# Patient Record
Sex: MALE | Race: WHITE | URBAN - METROPOLITAN AREA
[De-identification: names, ages, dates, MRNs, and addresses within clinical notes are randomized per-mention and may not be internally consistent; named-entity substitution may affect disease eponyms.]

---

## 2020-06-16 ENCOUNTER — OFFICE VISIT (OUTPATIENT)
Dept: URBAN - METROPOLITAN AREA CLINIC 113 | Facility: CLINIC | Age: 40
End: 2020-06-16

## 2020-06-25 ENCOUNTER — OFFICE VISIT (OUTPATIENT)
Dept: URBAN - METROPOLITAN AREA CLINIC 113 | Facility: CLINIC | Age: 40
End: 2020-06-25

## 2020-07-23 ENCOUNTER — OFFICE VISIT (OUTPATIENT)
Dept: URBAN - METROPOLITAN AREA CLINIC 113 | Facility: CLINIC | Age: 40
End: 2020-07-23

## 2020-07-25 ENCOUNTER — TELEPHONE ENCOUNTER (OUTPATIENT)
Dept: URBAN - METROPOLITAN AREA CLINIC 13 | Facility: CLINIC | Age: 40
End: 2020-07-25

## 2020-07-25 RX ORDER — LINACLOTIDE 290 UG/1
TAKE 1 CAPSULE DAILY ON EMPTY STOMACH CAPSULE, GELATIN COATED ORAL
Qty: 30 | Refills: 5 | OUTPATIENT
Start: 2017-01-24 | End: 2017-04-13

## 2020-07-25 RX ORDER — COLESEVELAM HYDROCHLORIDE 625 MG/1
TAKE 3 TABLET TWICE DAILY TABLET, FILM COATED ORAL
Refills: 0 | OUTPATIENT
End: 2020-06-16

## 2020-07-25 RX ORDER — ATORVASTATIN CALCIUM 20 MG/1
TAKE 1 TABLET DAILY TABLET, FILM COATED ORAL
Refills: 0 | OUTPATIENT
End: 2018-04-23

## 2020-07-25 RX ORDER — LISINOPRIL 10 MG/1
TAKE 1 TABLET DAILY TABLET ORAL
Refills: 0 | OUTPATIENT
End: 2020-06-16

## 2020-07-25 RX ORDER — OMEPRAZOLE 20 MG/1
TAKE 1 CAPSULE DAILY CAPSULE, DELAYED RELEASE ORAL
Qty: 90 | Refills: 3 | OUTPATIENT
End: 2018-04-23

## 2020-07-25 RX ORDER — LUBIPROSTONE 24 UG/1
TAKE ONE CAPSULE BY MOUTH TWICE A DAY CAPSULE, GELATIN COATED ORAL
Qty: 60 | Refills: 5 | OUTPATIENT
Start: 2017-04-20 | End: 2017-11-03

## 2020-07-26 ENCOUNTER — TELEPHONE ENCOUNTER (OUTPATIENT)
Dept: URBAN - METROPOLITAN AREA CLINIC 13 | Facility: CLINIC | Age: 40
End: 2020-07-26

## 2020-07-26 RX ORDER — ESOMEPRAZOLE MAGNESIUM 20 MG/1
TAKE ONE CAPSULE BY MOUTH EVERY DAY CAPSULE, DELAYED RELEASE ORAL
Qty: 90 | Refills: 0 | Status: ACTIVE | COMMUNITY
Start: 2018-03-29

## 2020-07-26 RX ORDER — FAMOTIDINE 20 MG/1
TABLET ORAL
Qty: 90 | Refills: 0 | Status: ACTIVE | COMMUNITY
Start: 2020-01-17

## 2020-07-26 RX ORDER — PROPAFENONE 225 MG/1
CAPSULE, EXTENDED RELEASE ORAL
Qty: 180 | Refills: 0 | Status: ACTIVE | COMMUNITY
Start: 2020-01-29

## 2020-07-26 RX ORDER — ALLOPURINOL 100 MG/1
TAKE 1 TABLET BY MOUTH ONCE DAILY TABLET ORAL
Qty: 60 | Refills: 0 | Status: ACTIVE | COMMUNITY
Start: 2018-02-23

## 2020-07-26 RX ORDER — PROPAFENONE HYDROCHLORIDE 225 MG/1
TAKE 1 TABLET EVERY TWELVE HOURS TABLET, FILM COATED ORAL
Refills: 0 | Status: ACTIVE | COMMUNITY

## 2020-07-26 RX ORDER — PANTOPRAZOLE 20 MG/1
TABLET, DELAYED RELEASE ORAL
Qty: 180 | Refills: 0 | Status: ACTIVE | COMMUNITY
Start: 2020-01-20

## 2020-07-26 RX ORDER — MIRTAZAPINE 45 MG/1
TABLET, FILM COATED ORAL
Qty: 90 | Refills: 0 | Status: ACTIVE | COMMUNITY
Start: 2020-06-30

## 2020-07-26 RX ORDER — PANTOPRAZOLE 20 MG/1
TABLET, DELAYED RELEASE ORAL
Qty: 180 | Refills: 0 | Status: ACTIVE | COMMUNITY
Start: 2020-04-21

## 2020-07-26 RX ORDER — DILTIAZEM HYDROCHLORIDE 120 MG/1
TAKE ONE CAPSULE BY MOUTH EVERY DAY CAPSULE, COATED, EXTENDED RELEASE ORAL
Qty: 90 | Refills: 0 | Status: ACTIVE | COMMUNITY
Start: 2018-01-15

## 2020-07-26 RX ORDER — MIRTAZAPINE 45 MG/1
TABLET, FILM COATED ORAL
Qty: 90 | Refills: 0 | Status: ACTIVE | COMMUNITY
Start: 2020-04-07

## 2020-07-26 RX ORDER — ESOMEPRAZOLE MAGNESIUM 20 MG/1
CAPSULE, DELAYED RELEASE ORAL
Qty: 90 | Refills: 0 | Status: ACTIVE | COMMUNITY
Start: 2018-11-28

## 2020-07-26 RX ORDER — POLYETHYLENE GLYCOL 3350 17 G/17G
MIX 17 GRAMS OF POWDER (1 CAPFUL) IN 8 OZ. OF LIQUID OF CHOICE TWICE D POWDER, FOR SOLUTION ORAL
Qty: 1054 | Refills: 0 | Status: ACTIVE | COMMUNITY
Start: 2019-01-05

## 2020-07-26 RX ORDER — FAMOTIDINE 40 MG/1
TAKE 1/2 TABLET BY MOUTH AT BEDTIME TABLET ORAL
Qty: 45 | Refills: 0 | Status: ACTIVE | COMMUNITY
Start: 2020-04-02

## 2020-07-26 RX ORDER — NAPROXEN 500 MG/1
TABLET ORAL
Qty: 60 | Refills: 0 | Status: ACTIVE | COMMUNITY
Start: 2020-07-22

## 2020-07-26 RX ORDER — DILTIAZEM HYDROCHLORIDE 120 MG/1
CAPSULE, COATED, EXTENDED RELEASE ORAL
Qty: 90 | Refills: 0 | Status: ACTIVE | COMMUNITY
Start: 2020-04-02

## 2020-07-26 RX ORDER — LURASIDONE HYDROCHLORIDE 40 MG/1
TAKE 1 TABLET BEDTIME TABLET, FILM COATED ORAL
Refills: 0 | Status: ACTIVE | COMMUNITY

## 2020-07-26 RX ORDER — PROPAFENONE 225 MG/1
TAKE ONE CAPSULE BY MOUTH EVERY 12 HOURS CAPSULE, EXTENDED RELEASE ORAL
Qty: 180 | Refills: 0 | Status: ACTIVE | COMMUNITY
Start: 2019-10-14

## 2020-07-26 RX ORDER — DILTIAZEM HYDROCHLORIDE 120 MG/1
TAKE ONE CAPSULE BY MOUTH EVERY DAY CAPSULE, COATED, EXTENDED RELEASE ORAL
Qty: 90 | Refills: 0 | Status: ACTIVE | COMMUNITY
Start: 2018-12-28

## 2020-07-26 RX ORDER — LITHIUM CARBONATE 300 MG/1
TAKE ONE CAPSULE BY MOUTH 3 TIMES DAILY CAPSULE, GELATIN COATED ORAL
Qty: 90 | Refills: 0 | Status: ACTIVE | COMMUNITY
Start: 2019-06-25

## 2020-07-26 RX ORDER — FAMOTIDINE 20 MG/1
TABLET ORAL
Qty: 90 | Refills: 0 | Status: ACTIVE | COMMUNITY
Start: 2020-06-29

## 2020-07-26 RX ORDER — LITHIUM CARBONATE 300 MG/1
CAPSULE, GELATIN COATED ORAL
Qty: 180 | Refills: 0 | Status: ACTIVE | COMMUNITY
Start: 2019-01-08

## 2020-07-26 RX ORDER — HYDROXYZINE HYDROCHLORIDE 25 MG/1
TAKE AS DIRECTED TABLET, FILM COATED ORAL
Qty: 60 | Refills: 0 | Status: ACTIVE | COMMUNITY
Start: 2017-11-29

## 2020-07-26 RX ORDER — LITHIUM CARBONATE 300 MG/1
TAKE 1 TABLET 3 TIMES DAILY TABLET, FILM COATED, EXTENDED RELEASE ORAL
Refills: 0 | Status: ACTIVE | COMMUNITY

## 2020-07-26 RX ORDER — PROPAFENONE 225 MG/1
TAKE ONE CAPSULE BY MOUTH EVERY 12 HOURS CAPSULE, EXTENDED RELEASE ORAL
Qty: 180 | Refills: 0 | Status: ACTIVE | COMMUNITY
Start: 2019-04-24

## 2020-07-26 RX ORDER — ONDANSETRON 8 MG/1
TAKE 1 TABLET BY MOUTH 3 TIMES DAILY AS NEEDED TABLET ORAL
Qty: 30 | Refills: 0 | Status: ACTIVE | COMMUNITY
Start: 2018-10-18

## 2020-07-26 RX ORDER — PROPAFENONE 225 MG/1
CAPSULE, EXTENDED RELEASE ORAL
Qty: 180 | Refills: 0 | Status: ACTIVE | COMMUNITY
Start: 2020-04-21

## 2020-07-26 RX ORDER — PANTOPRAZOLE 20 MG/1
TABLET, DELAYED RELEASE ORAL
Qty: 180 | Refills: 0 | Status: ACTIVE | COMMUNITY
Start: 2018-11-16

## 2020-07-26 RX ORDER — FAMOTIDINE 20 MG/1
TAKE 1 TABLET BY MOUTH AT BEDTIME TABLET ORAL
Qty: 90 | Refills: 0 | Status: ACTIVE | COMMUNITY
Start: 2019-12-18

## 2020-07-26 RX ORDER — LITHIUM CARBONATE 300 MG/1
CAPSULE, GELATIN COATED ORAL
Qty: 270 | Refills: 0 | Status: ACTIVE | COMMUNITY
Start: 2020-04-07

## 2020-07-26 RX ORDER — PANTOPRAZOLE SODIUM 40 MG/1
TAKE 1 TABLET DAILY TABLET, DELAYED RELEASE ORAL
Refills: 6 | Status: ACTIVE | COMMUNITY

## 2020-07-26 RX ORDER — PROPAFENONE 225 MG/1
TAKE ONE CAPSULE BY MOUTH EVERY TWELVE HOURS CAPSULE, EXTENDED RELEASE ORAL
Qty: 60 | Refills: 0 | Status: ACTIVE | COMMUNITY
Start: 2018-03-17

## 2020-07-26 RX ORDER — POLYETHYLENE GLYCOL 3350 17 G/17G
POUR 1 PACKET (17 GRAMS) OF POWDER INTO A CUP (4-8 OUNCE.) OF WATER, J POWDER, FOR SOLUTION ORAL
Qty: 56 | Refills: 0 | Status: ACTIVE | COMMUNITY
Start: 2019-08-14

## 2020-07-26 RX ORDER — LOSARTAN POTASSIUM 25 MG/1
TAKE 1 TABLET BY MOUTH EVERY DAY TABLET, FILM COATED ORAL
Qty: 90 | Refills: 0 | Status: ACTIVE | COMMUNITY
Start: 2020-04-02

## 2020-07-26 RX ORDER — ALLOPURINOL 100 MG/1
TAKE 1 TABLET BY MOUTH EVERY DAY TABLET ORAL
Qty: 30 | Refills: 0 | Status: ACTIVE | COMMUNITY
Start: 2020-02-06

## 2020-07-26 RX ORDER — LITHIUM CARBONATE 300 MG/1
CAPSULE, GELATIN COATED ORAL
Qty: 180 | Refills: 0 | Status: ACTIVE | COMMUNITY
Start: 2019-03-05

## 2020-07-26 RX ORDER — POLYETHYLENE GLYCOL 3350 17 G/17G
MIX 17 GRAMS (1 CAPFUL) IN 8 OZ. OF LIQUID OF CHOICE TWICE DAILY POWDER, FOR SOLUTION ORAL
Qty: 1020 | Refills: 0 | Status: ACTIVE | COMMUNITY
Start: 2019-09-25

## 2020-07-26 RX ORDER — MIRTAZAPINE 45 MG/1
TABLET, FILM COATED ORAL
Qty: 90 | Refills: 0 | Status: ACTIVE | COMMUNITY
Start: 2020-01-07

## 2020-07-26 RX ORDER — LITHIUM CARBONATE 300 MG/1
CAPSULE, GELATIN COATED ORAL
Qty: 90 | Refills: 0 | Status: ACTIVE | COMMUNITY
Start: 2019-09-17

## 2020-07-26 RX ORDER — DILTIAZEM HYDROCHLORIDE 120 MG/1
CAPSULE, COATED, EXTENDED RELEASE ORAL
Qty: 90 | Refills: 0 | Status: ACTIVE | COMMUNITY
Start: 2020-06-29

## 2020-07-26 RX ORDER — LITHIUM CARBONATE 300 MG/1
TAKE ONE CAPSULE BY MOUTH 3 TIMES DAILY CAPSULE, GELATIN COATED ORAL
Qty: 90 | Refills: 0 | Status: ACTIVE | COMMUNITY
Start: 2018-03-20

## 2020-07-26 RX ORDER — LITHIUM CARBONATE 300 MG/1
CAPSULE, GELATIN COATED ORAL
Qty: 270 | Refills: 0 | Status: ACTIVE | COMMUNITY
Start: 2020-01-07

## 2020-07-26 RX ORDER — MIRTAZAPINE 30 MG/1
TAKE 1 TABLET DAILY IN THE EVENING TABLET, FILM COATED ORAL
Qty: 30 | Refills: 0 | Status: ACTIVE | COMMUNITY

## 2020-07-26 RX ORDER — PANTOPRAZOLE 20 MG/1
TAKE 1 TABLET BY MOUTH TWICE DAILY TABLET, DELAYED RELEASE ORAL
Qty: 60 | Refills: 0 | Status: ACTIVE | COMMUNITY
Start: 2018-05-14

## 2020-09-16 ENCOUNTER — OFFICE VISIT (OUTPATIENT)
Dept: URBAN - METROPOLITAN AREA CLINIC 113 | Facility: CLINIC | Age: 40
End: 2020-09-16
Payer: COMMERCIAL

## 2020-09-16 VITALS
SYSTOLIC BLOOD PRESSURE: 133 MMHG | HEART RATE: 69 BPM | BODY MASS INDEX: 34.86 KG/M2 | DIASTOLIC BLOOD PRESSURE: 79 MMHG | WEIGHT: 249 LBS | TEMPERATURE: 97.8 F | HEIGHT: 71 IN

## 2020-09-16 DIAGNOSIS — K76.89 LIVER LESION: ICD-10-CM

## 2020-09-16 DIAGNOSIS — R74.8 ELEVATED LIVER ENZYMES: ICD-10-CM

## 2020-09-16 DIAGNOSIS — K76.0 HEPATIC STEATOSIS: ICD-10-CM

## 2020-09-16 PROCEDURE — 99213 OFFICE O/P EST LOW 20 MIN: CPT | Performed by: NURSE PRACTITIONER

## 2020-09-16 RX ORDER — LITHIUM CARBONATE 300 MG/1
TAKE ONE CAPSULE BY MOUTH 3 TIMES DAILY CAPSULE, GELATIN COATED ORAL
Refills: 0 | Status: ACTIVE | COMMUNITY
Start: 2018-03-20

## 2020-09-16 RX ORDER — FAMOTIDINE 40 MG/1
TAKE 1/2 TABLET BY MOUTH AT BEDTIME TABLET ORAL
Qty: 45 | Refills: 0 | Status: ACTIVE | COMMUNITY
Start: 2020-04-02

## 2020-09-16 RX ORDER — PROPAFENONE HYDROCHLORIDE 225 MG/1
TAKE 1 TABLET EVERY TWELVE HOURS TABLET, FILM COATED ORAL
Refills: 0 | Status: ACTIVE | COMMUNITY

## 2020-09-16 RX ORDER — PANTOPRAZOLE 20 MG/1
TAKE 1 TABLET BY MOUTH TWICE DAILY TABLET, DELAYED RELEASE ORAL
Qty: 60 | Refills: 0 | Status: ACTIVE | COMMUNITY
Start: 2018-05-14

## 2020-09-16 RX ORDER — NAPROXEN 500 MG/1
TABLET ORAL
Qty: 60 | Refills: 0 | Status: ON HOLD | COMMUNITY
Start: 2020-07-22

## 2020-09-16 RX ORDER — DILTIAZEM HYDROCHLORIDE 120 MG/1
TAKE ONE CAPSULE BY MOUTH EVERY DAY CAPSULE, COATED, EXTENDED RELEASE ORAL
Qty: 90 | Refills: 0 | Status: ACTIVE | COMMUNITY
Start: 2018-01-15

## 2020-09-16 RX ORDER — HYDROXYZINE HYDROCHLORIDE 25 MG/1
TAKE AS DIRECTED TABLET, FILM COATED ORAL
Qty: 60 | Refills: 0 | Status: ON HOLD | COMMUNITY
Start: 2017-11-29

## 2020-09-16 RX ORDER — POLYETHYLENE GLYCOL 3350 17 G/17G
MIX 17 GRAMS OF POWDER (1 CAPFUL) IN 8 OZ. OF LIQUID OF CHOICE TWICE D POWDER, FOR SOLUTION ORAL
Qty: 1054 | Refills: 0 | Status: ACTIVE | COMMUNITY
Start: 2019-01-05

## 2020-09-16 RX ORDER — ATORVASTATIN CALCIUM 20 MG/1
1 TABLET TABLET, FILM COATED ORAL ONCE A DAY
Status: ACTIVE | COMMUNITY

## 2020-09-16 RX ORDER — ONDANSETRON 8 MG/1
TAKE 1 TABLET BY MOUTH 3 TIMES DAILY AS NEEDED TABLET ORAL
Qty: 30 | Refills: 0 | Status: ON HOLD | COMMUNITY
Start: 2018-10-18

## 2020-09-16 RX ORDER — LOSARTAN POTASSIUM 25 MG/1
TAKE 1 TABLET BY MOUTH EVERY DAY TABLET, FILM COATED ORAL
Qty: 90 | Refills: 0 | Status: ACTIVE | COMMUNITY
Start: 2020-04-02

## 2020-09-16 RX ORDER — LITHIUM CARBONATE 300 MG/1
TAKE 1 TABLET 3 TIMES DAILY TABLET, FILM COATED, EXTENDED RELEASE ORAL
Refills: 0 | Status: ON HOLD | COMMUNITY

## 2020-09-16 RX ORDER — LURASIDONE HYDROCHLORIDE 40 MG/1
TAKE 3  TABLET BEDTIME TABLET, FILM COATED ORAL
Refills: 0 | Status: ACTIVE | COMMUNITY

## 2020-09-16 RX ORDER — ESOMEPRAZOLE MAGNESIUM 20 MG/1
TAKE ONE CAPSULE BY MOUTH EVERY DAY CAPSULE, DELAYED RELEASE ORAL
Qty: 90 | Refills: 0 | Status: ON HOLD | COMMUNITY
Start: 2018-03-29

## 2020-09-16 RX ORDER — PROPAFENONE 225 MG/1
TAKE ONE CAPSULE BY MOUTH EVERY TWELVE HOURS CAPSULE, EXTENDED RELEASE ORAL
Qty: 60 | Refills: 0 | Status: ON HOLD | COMMUNITY
Start: 2018-03-17

## 2020-09-16 RX ORDER — MIRTAZAPINE 45 MG/1
1 TABLET AT BEDTIME TABLET, FILM COATED ORAL
Refills: 0 | Status: ACTIVE | COMMUNITY
Start: 2020-01-07

## 2020-09-16 RX ORDER — FAMOTIDINE 20 MG/1
TAKE 1 TABLET BY MOUTH AT BEDTIME TABLET ORAL
Qty: 90 | Refills: 0 | Status: ON HOLD | COMMUNITY
Start: 2019-12-18

## 2020-09-16 RX ORDER — POLYETHYLENE GLYCOL 3350 17 G/17G
POUR 1 PACKET (17 GRAMS) OF POWDER INTO A CUP (4-8 OUNCE.) OF WATER, J POWDER, FOR SOLUTION ORAL
Qty: 56 | Refills: 0 | Status: ON HOLD | COMMUNITY
Start: 2019-08-14

## 2020-09-16 RX ORDER — MIRTAZAPINE 30 MG/1
TAKE 1 TABLET DAILY IN THE EVENING TABLET, FILM COATED ORAL
Qty: 30 | Refills: 0 | Status: ACTIVE | COMMUNITY

## 2020-09-16 RX ORDER — PANTOPRAZOLE SODIUM 40 MG/1
TAKE 1 TABLET DAILY TABLET, DELAYED RELEASE ORAL
Refills: 6 | Status: ACTIVE | COMMUNITY

## 2020-09-16 RX ORDER — ALLOPURINOL 100 MG/1
TAKE 2  TABLET BY MOUTH ONCE DAILY TABLET ORAL
Refills: 0 | Status: ACTIVE | COMMUNITY
Start: 2018-02-23

## 2020-09-16 NOTE — HPI-OTHER HISTORIES
He provides labs from 6/4/20 which show AST 39, ALT 84, , T bili 0.8. His CBC and CMP were otherwise unremarkable.  Labs on 4/8/19 show normal liver function tests, normal ceruloplasmin. Labs 10/31/17 : iron 130, TIBC 336, iron sat 39%, ferritin 225. Negative AMA, ROOSEVELT, ASMA. Reactive HBsAb. Negative HAV Ab total, HBsAg, HCV Ab. Normal A1AT

## 2020-09-16 NOTE — HPI-TODAY'S VISIT:
40-year old gentleman with a history of bipolar disorder presenting for follow-up regarding elevated liver function tests.  He was last seen 6/16/20 for follow-up regarding elevated LFTs secondary to NAFLD; he was recommended efforts at weight reduction with plan to trend his liver enzymes. His GERD was controlled with pantoprazole and famotidine, and constipation managed with MiraLAX. Following the last visit, his PCP had an abdominal ultrasound performed, which reportedly revealed hepatomegaly and a liver lesion. Subsequent CT of the abdomen with and without contrast on 9/10/20 demonstrated hepatic steatosis and a small lesion within the medical hepatic segment which is low in attenuation on all phases, most consistent with a cyst. Labs : 7/29/2020 show H/H 16.1/47, MCV 91.1, , WBC 7.3.  AST 43, ALT 92, ALP 99, T bili 0.7, CMP otherwise unremarkable.  TSH 1.28.  Total cholesterol 132, triglycerides 116. He is asymptomatic from a GI standpoint, denying abdominal pain, nausea, vomiting, change in bowel habits, or blood per rectum. He has lost 6lb since the time of his last visit with a reduction in carbohydrates and sweets.

## 2020-12-01 ENCOUNTER — LAB OUTSIDE AN ENCOUNTER (OUTPATIENT)
Dept: URBAN - METROPOLITAN AREA CLINIC 113 | Facility: CLINIC | Age: 40
End: 2020-12-01

## 2020-12-17 ENCOUNTER — OFFICE VISIT (OUTPATIENT)
Dept: URBAN - METROPOLITAN AREA CLINIC 113 | Facility: CLINIC | Age: 40
End: 2020-12-17

## 2021-01-05 ENCOUNTER — OFFICE VISIT (OUTPATIENT)
Dept: URBAN - METROPOLITAN AREA CLINIC 113 | Facility: CLINIC | Age: 41
End: 2021-01-05

## 2021-02-17 ENCOUNTER — OFFICE VISIT (OUTPATIENT)
Dept: URBAN - METROPOLITAN AREA CLINIC 113 | Facility: CLINIC | Age: 41
End: 2021-02-17

## 2021-02-26 ENCOUNTER — OFFICE VISIT (OUTPATIENT)
Dept: URBAN - METROPOLITAN AREA CLINIC 113 | Facility: CLINIC | Age: 41
End: 2021-02-26

## 2021-03-03 ENCOUNTER — OFFICE VISIT (OUTPATIENT)
Dept: URBAN - METROPOLITAN AREA CLINIC 107 | Facility: CLINIC | Age: 41
End: 2021-03-03

## 2021-03-03 RX ORDER — LITHIUM CARBONATE 300 MG/1
TAKE 1 TABLET 3 TIMES DAILY TABLET, FILM COATED, EXTENDED RELEASE ORAL
Refills: 0 | Status: ON HOLD | COMMUNITY

## 2021-03-03 RX ORDER — POLYETHYLENE GLYCOL 3350 17 G/17G
POUR 1 PACKET (17 GRAMS) OF POWDER INTO A CUP (4-8 OUNCE.) OF WATER, J POWDER, FOR SOLUTION ORAL
Qty: 56 | Refills: 0 | Status: ON HOLD | COMMUNITY
Start: 2019-08-14

## 2021-03-03 RX ORDER — ESOMEPRAZOLE MAGNESIUM 20 MG/1
TAKE ONE CAPSULE BY MOUTH EVERY DAY CAPSULE, DELAYED RELEASE ORAL
Qty: 90 | Refills: 0 | Status: ON HOLD | COMMUNITY
Start: 2018-03-29

## 2021-03-03 RX ORDER — DILTIAZEM HYDROCHLORIDE 120 MG/1
TAKE ONE CAPSULE BY MOUTH EVERY DAY CAPSULE, COATED, EXTENDED RELEASE ORAL
Qty: 90 | Refills: 0 | Status: ACTIVE | COMMUNITY
Start: 2018-01-15

## 2021-03-03 RX ORDER — ALLOPURINOL 100 MG/1
TAKE 2  TABLET BY MOUTH ONCE DAILY TABLET ORAL
Refills: 0 | Status: ACTIVE | COMMUNITY
Start: 2018-02-23

## 2021-03-03 RX ORDER — ATORVASTATIN CALCIUM 20 MG/1
1 TABLET TABLET, FILM COATED ORAL ONCE A DAY
Status: ACTIVE | COMMUNITY

## 2021-03-03 RX ORDER — FAMOTIDINE 40 MG/1
TAKE 1/2 TABLET BY MOUTH AT BEDTIME TABLET ORAL
Qty: 45 | Refills: 0 | Status: ACTIVE | COMMUNITY
Start: 2020-04-02

## 2021-03-03 RX ORDER — MIRTAZAPINE 45 MG/1
1 TABLET AT BEDTIME TABLET, FILM COATED ORAL
Refills: 0 | Status: ACTIVE | COMMUNITY
Start: 2020-01-07

## 2021-03-03 RX ORDER — HYDROXYZINE HYDROCHLORIDE 25 MG/1
TAKE AS DIRECTED TABLET, FILM COATED ORAL
Qty: 60 | Refills: 0 | Status: ON HOLD | COMMUNITY
Start: 2017-11-29

## 2021-03-03 RX ORDER — LITHIUM CARBONATE 300 MG/1
TAKE ONE CAPSULE BY MOUTH 3 TIMES DAILY CAPSULE, GELATIN COATED ORAL
Refills: 0 | Status: ACTIVE | COMMUNITY
Start: 2018-03-20

## 2021-03-03 RX ORDER — PANTOPRAZOLE 20 MG/1
TAKE 1 TABLET BY MOUTH TWICE DAILY TABLET, DELAYED RELEASE ORAL
Qty: 60 | Refills: 0 | Status: ACTIVE | COMMUNITY
Start: 2018-05-14

## 2021-03-03 RX ORDER — NAPROXEN 500 MG/1
TABLET ORAL
Qty: 60 | Refills: 0 | Status: ON HOLD | COMMUNITY
Start: 2020-07-22

## 2021-03-03 RX ORDER — PROPAFENONE 225 MG/1
TAKE ONE CAPSULE BY MOUTH EVERY TWELVE HOURS CAPSULE, EXTENDED RELEASE ORAL
Qty: 60 | Refills: 0 | Status: ON HOLD | COMMUNITY
Start: 2018-03-17

## 2021-03-03 RX ORDER — PROPAFENONE HYDROCHLORIDE 225 MG/1
TAKE 1 TABLET EVERY TWELVE HOURS TABLET, FILM COATED ORAL
Refills: 0 | Status: ACTIVE | COMMUNITY

## 2021-03-03 RX ORDER — MIRTAZAPINE 30 MG/1
TAKE 1 TABLET DAILY IN THE EVENING TABLET, FILM COATED ORAL
Qty: 30 | Refills: 0 | Status: ACTIVE | COMMUNITY

## 2021-03-03 RX ORDER — PANTOPRAZOLE SODIUM 40 MG/1
TAKE 1 TABLET DAILY TABLET, DELAYED RELEASE ORAL
Refills: 6 | Status: ACTIVE | COMMUNITY

## 2021-03-03 RX ORDER — LOSARTAN POTASSIUM 25 MG/1
TAKE 1 TABLET BY MOUTH EVERY DAY TABLET, FILM COATED ORAL
Qty: 90 | Refills: 0 | Status: ACTIVE | COMMUNITY
Start: 2020-04-02

## 2021-03-03 RX ORDER — LURASIDONE HYDROCHLORIDE 40 MG/1
TAKE 3  TABLET BEDTIME TABLET, FILM COATED ORAL
Refills: 0 | Status: ACTIVE | COMMUNITY

## 2021-03-03 RX ORDER — FAMOTIDINE 20 MG/1
TAKE 1 TABLET BY MOUTH AT BEDTIME TABLET ORAL
Qty: 90 | Refills: 0 | Status: ON HOLD | COMMUNITY
Start: 2019-12-18

## 2021-03-03 RX ORDER — POLYETHYLENE GLYCOL 3350 17 G/17G
MIX 17 GRAMS OF POWDER (1 CAPFUL) IN 8 OZ. OF LIQUID OF CHOICE TWICE D POWDER, FOR SOLUTION ORAL
Qty: 1054 | Refills: 0 | Status: ACTIVE | COMMUNITY
Start: 2019-01-05

## 2021-03-03 RX ORDER — ONDANSETRON 8 MG/1
TAKE 1 TABLET BY MOUTH 3 TIMES DAILY AS NEEDED TABLET ORAL
Qty: 30 | Refills: 0 | Status: ON HOLD | COMMUNITY
Start: 2018-10-18

## 2021-03-03 NOTE — HPI-OTHER HISTORIES
Previous abdominal ultrasound reportedly revealed hepatomegaly and a liver lesion. Subsequent CT of the abdomen with and without contrast on 9/10/20 demonstrated hepatic steatosis and a small lesion within the medical hepatic segment which is low in attenuation on all phases, most consistent with a cyst. Labs : 7/29/2020 show H/H 16.1/47, MCV 91.1, , WBC 7.3.  AST 43, ALT 92, ALP 99, T bili 0.7, CMP otherwise unremarkable.  TSH 1.28.  Total cholesterol 132, triglycerides 116. He provides labs from 6/4/20 which show AST 39, ALT 84, , T bili 0.8. His CBC and CMP were otherwise unremarkable.  Labs on 4/8/19 show normal liver function tests, normal ceruloplasmin. Labs 10/31/17 : iron 130, TIBC 336, iron sat 39%, ferritin 225. Negative AMA, ROOSEVELT, ASMA. Reactive HBsAb. Negative HAV Ab total, HBsAg, HCV Ab. Normal A1AT

## 2021-03-03 NOTE — HPI-TODAY'S VISIT:
40-year old gentleman with a history of bipolar disorder presenting for follow-up regarding elevated liver function tests. He was last seen 9/16/2020 for follow-up regarding elevated liver enzymes secondary to nonalcoholic fatty liver disease.  Recent CT demonstrated a small hypoattenuating liver lesion, suggestive of a cyst.  He was encouraged efforts at weight reduction, with plan to trend his LFTs.  A MRI of the abdomen with and without contrast was recommended in 6 months to further characterize his liver lesion and document stability.

## 2021-03-10 ENCOUNTER — OFFICE VISIT (OUTPATIENT)
Dept: URBAN - METROPOLITAN AREA CLINIC 107 | Facility: CLINIC | Age: 41
End: 2021-03-10
Payer: COMMERCIAL

## 2021-03-10 ENCOUNTER — WEB ENCOUNTER (OUTPATIENT)
Dept: URBAN - METROPOLITAN AREA CLINIC 107 | Facility: CLINIC | Age: 41
End: 2021-03-10

## 2021-03-10 VITALS
TEMPERATURE: 98.8 F | BODY MASS INDEX: 34.58 KG/M2 | WEIGHT: 247 LBS | SYSTOLIC BLOOD PRESSURE: 130 MMHG | HEIGHT: 71 IN | DIASTOLIC BLOOD PRESSURE: 88 MMHG

## 2021-03-10 DIAGNOSIS — R74.8 ELEVATED LIVER ENZYMES: ICD-10-CM

## 2021-03-10 DIAGNOSIS — K76.89 LIVER LESION: ICD-10-CM

## 2021-03-10 DIAGNOSIS — K76.0 HEPATIC STEATOSIS: ICD-10-CM

## 2021-03-10 PROCEDURE — 99213 OFFICE O/P EST LOW 20 MIN: CPT | Performed by: NURSE PRACTITIONER

## 2021-03-10 RX ORDER — NAPROXEN 500 MG/1
TABLET ORAL
Qty: 60 | Refills: 0 | Status: ON HOLD | COMMUNITY
Start: 2020-07-22

## 2021-03-10 RX ORDER — DILTIAZEM HYDROCHLORIDE 120 MG/1
TAKE ONE CAPSULE BY MOUTH EVERY DAY CAPSULE, COATED, EXTENDED RELEASE ORAL
Qty: 90 | Refills: 0 | Status: ACTIVE | COMMUNITY
Start: 2018-01-15

## 2021-03-10 RX ORDER — MIRTAZAPINE 30 MG/1
TAKE 1 TABLET DAILY IN THE EVENING TABLET, FILM COATED ORAL
Qty: 30 | Refills: 0 | Status: ON HOLD | COMMUNITY

## 2021-03-10 RX ORDER — POLYETHYLENE GLYCOL 3350 17 G/17G
POUR 1 PACKET (17 GRAMS) OF POWDER INTO A CUP (4-8 OUNCE.) OF WATER, J POWDER, FOR SOLUTION ORAL
Qty: 56 | Refills: 0 | Status: ON HOLD | COMMUNITY
Start: 2019-08-14

## 2021-03-10 RX ORDER — ATORVASTATIN CALCIUM 20 MG/1
1 TABLET TABLET, FILM COATED ORAL ONCE A DAY
Status: ACTIVE | COMMUNITY

## 2021-03-10 RX ORDER — PANTOPRAZOLE 20 MG/1
TAKE 1 TABLET BY MOUTH TWICE DAILY TABLET, DELAYED RELEASE ORAL
Qty: 60 | Refills: 0 | Status: ON HOLD | COMMUNITY
Start: 2018-05-14

## 2021-03-10 RX ORDER — MIRTAZAPINE 45 MG/1
1 TABLET AT BEDTIME TABLET, FILM COATED ORAL
Refills: 0 | Status: ACTIVE | COMMUNITY
Start: 2020-01-07

## 2021-03-10 RX ORDER — HYDROXYZINE HYDROCHLORIDE 25 MG/1
TAKE AS DIRECTED TABLET, FILM COATED ORAL
Qty: 60 | Refills: 0 | Status: ON HOLD | COMMUNITY
Start: 2017-11-29

## 2021-03-10 RX ORDER — LITHIUM CARBONATE 300 MG/1
TAKE 1 TABLET 3 TIMES DAILY TABLET, FILM COATED, EXTENDED RELEASE ORAL
Refills: 0 | Status: ON HOLD | COMMUNITY

## 2021-03-10 RX ORDER — LOSARTAN POTASSIUM 50 MG/1
TAKE 1 TABLET BY MOUTH EVERY DAY TABLET ORAL ONCE A DAY
Refills: 0 | Status: ACTIVE | COMMUNITY
Start: 2020-04-02

## 2021-03-10 RX ORDER — FAMOTIDINE 40 MG/1
TAKE 1/2 TABLET BY MOUTH AT BEDTIME TABLET ORAL
Qty: 45 | Refills: 0 | Status: ACTIVE | COMMUNITY
Start: 2020-04-02

## 2021-03-10 RX ORDER — FAMOTIDINE 20 MG/1
TAKE 1 TABLET BY MOUTH AT BEDTIME TABLET ORAL
Qty: 90 | Refills: 0 | Status: ON HOLD | COMMUNITY
Start: 2019-12-18

## 2021-03-10 RX ORDER — POLYETHYLENE GLYCOL 3350 17 G/17G
MIX 17 GRAMS OF POWDER (1 CAPFUL) IN 8 OZ. OF LIQUID OF CHOICE TWICE D POWDER, FOR SOLUTION ORAL
Qty: 1054 | Refills: 0 | Status: ACTIVE | COMMUNITY
Start: 2019-01-05

## 2021-03-10 RX ORDER — ONDANSETRON 8 MG/1
TAKE 1 TABLET BY MOUTH 3 TIMES DAILY AS NEEDED TABLET ORAL
Qty: 30 | Refills: 0 | Status: ON HOLD | COMMUNITY
Start: 2018-10-18

## 2021-03-10 RX ORDER — PANTOPRAZOLE SODIUM 40 MG/1
TAKE 1 TABLET DAILY TABLET, DELAYED RELEASE ORAL
Refills: 6 | Status: ACTIVE | COMMUNITY

## 2021-03-10 RX ORDER — ALLOPURINOL 100 MG/1
TAKE 2  TABLET BY MOUTH ONCE DAILY TABLET ORAL
Refills: 0 | Status: ACTIVE | COMMUNITY
Start: 2018-02-23

## 2021-03-10 RX ORDER — ESOMEPRAZOLE MAGNESIUM 20 MG/1
TAKE ONE CAPSULE BY MOUTH EVERY DAY CAPSULE, DELAYED RELEASE ORAL
Qty: 90 | Refills: 0 | Status: ON HOLD | COMMUNITY
Start: 2018-03-29

## 2021-03-10 RX ORDER — LURASIDONE HYDROCHLORIDE 40 MG/1
TAKE 3  TABLET BEDTIME TABLET, FILM COATED ORAL
Refills: 0 | Status: ACTIVE | COMMUNITY

## 2021-03-10 RX ORDER — LITHIUM CARBONATE 300 MG/1
TAKE ONE CAPSULE BY MOUTH 3 TIMES DAILY CAPSULE, GELATIN COATED ORAL
Refills: 0 | Status: ACTIVE | COMMUNITY
Start: 2018-03-20

## 2021-03-10 RX ORDER — PROPAFENONE 225 MG/1
TAKE ONE CAPSULE BY MOUTH EVERY TWELVE HOURS CAPSULE, EXTENDED RELEASE ORAL
Qty: 60 | Refills: 0 | Status: ON HOLD | COMMUNITY
Start: 2018-03-17

## 2021-03-10 RX ORDER — PROPAFENONE HYDROCHLORIDE 225 MG/1
TAKE 1 TABLET EVERY TWELVE HOURS TABLET, FILM COATED ORAL
Refills: 0 | Status: ACTIVE | COMMUNITY

## 2021-03-10 NOTE — HPI-TODAY'S VISIT:
40-year old gentleman with a history of bipolar disorder presenting for follow-up regarding elevated liver function tests. He was last seen 9/16/2020 for follow-up regarding elevated liver enzymes secondary to nonalcoholic fatty liver disease.  Recent CT demonstrated a small hypoattenuating liver lesion, suggestive of a cyst.  He was encouraged efforts at weight reduction, with plan to trend his LFTs.  A MRI of the abdomen with and without contrast was recommended in 6 months to further characterize his liver lesion and document stability. He is asymptomatic from a GI standpoint, denying abdominal pain, nausea, vomiting, change in bowel habits, or blood per rectum. He has been working on weight reduction, by reducing oral intake. He does not drink alcohol.

## 2021-03-10 NOTE — HPI-OTHER HISTORIES
Labs 2/17/21: H/H 16.4/47.6, MCV 91, Plt 276, WBC 7.1. AST 39, ALT 75, , Tbili 0.7, CMP otherwise unremarkable.  Previous abdominal ultrasound reportedly revealed hepatomegaly and a liver lesion. Subsequent CT of the abdomen with and without contrast on 9/10/20 demonstrated hepatic steatosis and a small lesion within the medical hepatic segment which is low in attenuation on all phases, most consistent with a cyst. Labs : 7/29/2020 show H/H 16.1/47, MCV 91.1, , WBC 7.3.  AST 43, ALT 92, ALP 99, T bili 0.7, CMP otherwise unremarkable.  TSH 1.28.  Total cholesterol 132, triglycerides 116. He provides labs from 6/4/20 which show AST 39, ALT 84, , T bili 0.8. His CBC and CMP were otherwise unremarkable.  Labs on 4/8/19 show normal liver function tests, normal ceruloplasmin. Labs 10/31/17 : iron 130, TIBC 336, iron sat 39%, ferritin 225. Negative AMA, ROOSEVELT, ASMA. Reactive HBsAb. Negative HAV Ab total, HBsAg, HCV Ab. Normal A1AT

## 2021-03-22 ENCOUNTER — OFFICE VISIT (OUTPATIENT)
Dept: URBAN - METROPOLITAN AREA CLINIC 113 | Facility: CLINIC | Age: 41
End: 2021-03-22

## 2021-05-03 ENCOUNTER — TELEPHONE ENCOUNTER (OUTPATIENT)
Dept: URBAN - METROPOLITAN AREA CLINIC 113 | Facility: CLINIC | Age: 41
End: 2021-05-03

## 2021-06-10 ENCOUNTER — OFFICE VISIT (OUTPATIENT)
Dept: URBAN - METROPOLITAN AREA CLINIC 107 | Facility: CLINIC | Age: 41
End: 2021-06-10

## 2021-09-03 ENCOUNTER — OFFICE VISIT (OUTPATIENT)
Dept: URBAN - METROPOLITAN AREA CLINIC 107 | Facility: CLINIC | Age: 41
End: 2021-09-03

## 2021-11-12 ENCOUNTER — OFFICE VISIT (OUTPATIENT)
Dept: URBAN - METROPOLITAN AREA CLINIC 107 | Facility: CLINIC | Age: 41
End: 2021-11-12

## 2022-01-04 ENCOUNTER — OFFICE VISIT (OUTPATIENT)
Dept: URBAN - METROPOLITAN AREA CLINIC 107 | Facility: CLINIC | Age: 42
End: 2022-01-04

## 2022-03-14 ENCOUNTER — OFFICE VISIT (OUTPATIENT)
Dept: URBAN - METROPOLITAN AREA CLINIC 107 | Facility: CLINIC | Age: 42
End: 2022-03-14

## 2022-05-05 ENCOUNTER — OFFICE VISIT (OUTPATIENT)
Dept: URBAN - METROPOLITAN AREA CLINIC 107 | Facility: CLINIC | Age: 42
End: 2022-05-05

## 2022-08-18 ENCOUNTER — OFFICE VISIT (OUTPATIENT)
Dept: URBAN - METROPOLITAN AREA CLINIC 107 | Facility: CLINIC | Age: 42
End: 2022-08-18

## 2022-09-06 ENCOUNTER — TELEPHONE ENCOUNTER (OUTPATIENT)
Dept: URBAN - METROPOLITAN AREA CLINIC 113 | Facility: CLINIC | Age: 42
End: 2022-09-06

## 2022-09-06 ENCOUNTER — WEB ENCOUNTER (OUTPATIENT)
Dept: URBAN - METROPOLITAN AREA CLINIC 107 | Facility: CLINIC | Age: 42
End: 2022-09-06

## 2022-09-06 ENCOUNTER — OFFICE VISIT (OUTPATIENT)
Dept: URBAN - METROPOLITAN AREA CLINIC 107 | Facility: CLINIC | Age: 42
End: 2022-09-06
Payer: COMMERCIAL

## 2022-09-06 VITALS
RESPIRATION RATE: 20 BRPM | TEMPERATURE: 98.1 F | BODY MASS INDEX: 37.8 KG/M2 | SYSTOLIC BLOOD PRESSURE: 133 MMHG | HEART RATE: 62 BPM | HEIGHT: 71 IN | WEIGHT: 270 LBS | DIASTOLIC BLOOD PRESSURE: 81 MMHG

## 2022-09-06 DIAGNOSIS — R74.8 ELEVATED LIVER ENZYMES: ICD-10-CM

## 2022-09-06 DIAGNOSIS — K21.9 GASTROESOPHAGEAL REFLUX DISEASE, UNSPECIFIED WHETHER ESOPHAGITIS PRESENT: ICD-10-CM

## 2022-09-06 DIAGNOSIS — K76.0 HEPATIC STEATOSIS: ICD-10-CM

## 2022-09-06 DIAGNOSIS — K76.89 LIVER LESION: ICD-10-CM

## 2022-09-06 PROBLEM — 197321007 STEATOSIS OF LIVER: Status: ACTIVE | Noted: 2020-09-16

## 2022-09-06 PROBLEM — 300331000 LESION OF LIVER: Status: ACTIVE | Noted: 2020-09-16

## 2022-09-06 PROBLEM — 707724006 ELEVATED LIVER ENZYMES LEVEL: Status: ACTIVE | Noted: 2020-09-16

## 2022-09-06 PROCEDURE — 99213 OFFICE O/P EST LOW 20 MIN: CPT | Performed by: NURSE PRACTITIONER

## 2022-09-06 RX ORDER — LURASIDONE HYDROCHLORIDE 60 MG/1
TAKE 3  TABLET BEDTIME TABLET, FILM COATED ORAL
Refills: 0 | Status: ACTIVE | COMMUNITY

## 2022-09-06 RX ORDER — LORATADINE 10 MG
1 PACKET MIXED WITH 8 OUNCES OF FLUID TABLET,DISINTEGRATING ORAL ONCE A DAY
Status: ACTIVE | COMMUNITY

## 2022-09-06 RX ORDER — DEXLANSOPRAZOLE 60 MG/1
1 CAPSULE CAPSULE, DELAYED RELEASE ORAL ONCE A DAY
Status: ACTIVE | COMMUNITY

## 2022-09-06 RX ORDER — HYDROCHLOROTHIAZIDE 25 MG/1
1 TABLET IN THE MORNING TABLET ORAL ONCE A DAY
Status: ACTIVE | COMMUNITY

## 2022-09-06 RX ORDER — ALLOPURINOL 100 MG/1
TAKE 2  TABLET BY MOUTH ONCE DAILY TABLET ORAL
Refills: 0 | Status: ACTIVE | COMMUNITY
Start: 2018-02-23

## 2022-09-06 RX ORDER — ASPIRIN 81 MG/1
1 TABLET TABLET ORAL ONCE A DAY
Status: ACTIVE | COMMUNITY

## 2022-09-06 RX ORDER — DILTIAZEM HYDROCHLORIDE 240 MG/1
TAKE ONE CAPSULE BY MOUTH EVERY DAY CAPSULE, COATED, EXTENDED RELEASE ORAL
Qty: 90 | Refills: 0 | Status: ACTIVE | COMMUNITY
Start: 2018-01-15

## 2022-09-06 RX ORDER — LITHIUM CARBONATE 300 MG/1
TAKE ONE CAPSULE BY MOUTH 3 TIMES DAILY CAPSULE, GELATIN COATED ORAL
Refills: 0 | Status: ACTIVE | COMMUNITY
Start: 2018-03-20

## 2022-09-06 RX ORDER — ATORVASTATIN CALCIUM 20 MG/1
1 TABLET TABLET, FILM COATED ORAL ONCE A DAY
Status: ACTIVE | COMMUNITY

## 2022-09-06 NOTE — HPI-OTHER HISTORIES
Labs:  8/29/2022: BMP normal with exception of glucose 129.  LFTs: TB 0.4, , ALT 75, AST 47.  Lithium level normal at 0.8. 5/12/2022:CBC: WBC 7.6, hemoglobin 16.5, MCV 95, platelet 282.  BMP normal with exception of glucose 106, CO2 19.  LFTs: TB 0.5, , ALT 81, AST 43.  Cholesterol panel normal with exception of triglycerides 190, HDL 38.  Lithium level 0.5. HgbA1C 5.6.

## 2022-09-06 NOTE — HPI-TODAY'S VISIT:
This is a 42-year-old gentleman with a history of bipolar disorder, elevated liver enzymes secondary to nonalcoholic fatty liver disease, and a small hypoattenuating liver lesions suggestive of a hepatic cyst on CT presenting for follow-up. He was last seen 3/10/2021.  He had reduced calorie intake attempting to lose weight.  There was slight improvement in transaminitis on recent labs.  At a previous visit, an MRI had been recommended in 6 months to further characterize his liver lesion and document stability.  He was unwilling to schedule the MRI at the time of his visit but agreed to have this done before his follow-up visit in June.  Repeat liver enzymes were recommended in 3 months.  7 office visits have been rescheduled since his last visit.  There are no MRI results or lab results since his last visit currently in his record. He has a history of acid reflux.  He was taking pantoprazole 40 mg twice a day.  He began to have intermittent indigestion or heartburn.  3 weeks ago, his primary care physician prescribed Dexilant.  He has been asymptomatic since starting therapy.  He reports an EGD in the 1990s that revealed ulcers in his esophagus.  He denies dysphagia, abdominal pain, or nausea.  He denies NSAID use.  He has a history of constipation for which he takes MiraLAX 1 capful daily.  This is effective.  He denies red blood per rectum or melena.  He did not have an MRI as ordered at his last visit. He reports he has been staying in a lot since his last visit.  He has gained weight.  He denies changes in his medical or surgical history since his last visit.

## 2022-09-07 ENCOUNTER — LAB OUTSIDE AN ENCOUNTER (OUTPATIENT)
Dept: URBAN - METROPOLITAN AREA CLINIC 113 | Facility: CLINIC | Age: 42
End: 2022-09-07

## 2022-09-07 PROBLEM — 691491000119105: Status: ACTIVE | Noted: 2022-09-07

## 2022-09-07 PROBLEM — 235595009: Status: ACTIVE | Noted: 2022-09-06

## 2022-10-11 ENCOUNTER — OFFICE VISIT (OUTPATIENT)
Dept: URBAN - METROPOLITAN AREA CLINIC 107 | Facility: CLINIC | Age: 42
End: 2022-10-11

## 2023-07-19 ENCOUNTER — OFFICE VISIT (OUTPATIENT)
Dept: URBAN - METROPOLITAN AREA CLINIC 107 | Facility: CLINIC | Age: 43
End: 2023-07-19

## 2023-09-26 ENCOUNTER — OFFICE VISIT (OUTPATIENT)
Dept: URBAN - METROPOLITAN AREA CLINIC 107 | Facility: CLINIC | Age: 43
End: 2023-09-26

## 2023-12-12 ENCOUNTER — OFFICE VISIT (OUTPATIENT)
Dept: URBAN - METROPOLITAN AREA CLINIC 107 | Facility: CLINIC | Age: 43
End: 2023-12-12

## 2024-02-13 ENCOUNTER — OV EP (OUTPATIENT)
Dept: URBAN - METROPOLITAN AREA CLINIC 107 | Facility: CLINIC | Age: 44
End: 2024-02-13

## 2024-03-19 ENCOUNTER — OV EP (OUTPATIENT)
Dept: URBAN - METROPOLITAN AREA CLINIC 107 | Facility: CLINIC | Age: 44
End: 2024-03-19

## 2024-03-19 RX ORDER — DEXLANSOPRAZOLE 60 MG/1
1 CAPSULE CAPSULE, DELAYED RELEASE ORAL ONCE A DAY
Status: ACTIVE | COMMUNITY

## 2024-03-19 RX ORDER — ASPIRIN 81 MG/1
1 TABLET TABLET ORAL ONCE A DAY
Status: ACTIVE | COMMUNITY

## 2024-03-19 RX ORDER — HYDROCHLOROTHIAZIDE 25 MG/1
1 TABLET IN THE MORNING TABLET ORAL ONCE A DAY
Status: ACTIVE | COMMUNITY

## 2024-03-19 RX ORDER — ATORVASTATIN CALCIUM 20 MG/1
1 TABLET TABLET, FILM COATED ORAL ONCE A DAY
Status: ACTIVE | COMMUNITY

## 2024-03-19 RX ORDER — LURASIDONE HYDROCHLORIDE 60 MG/1
TAKE 3  TABLET BEDTIME TABLET, FILM COATED ORAL
Refills: 0 | Status: ACTIVE | COMMUNITY

## 2024-03-19 RX ORDER — DILTIAZEM HYDROCHLORIDE 240 MG/1
TAKE ONE CAPSULE BY MOUTH EVERY DAY CAPSULE, COATED, EXTENDED RELEASE ORAL
Qty: 90 | Refills: 0 | Status: ACTIVE | COMMUNITY
Start: 2018-01-15

## 2024-03-19 RX ORDER — LORATADINE 10 MG
1 PACKET MIXED WITH 8 OUNCES OF FLUID TABLET,DISINTEGRATING ORAL ONCE A DAY
Status: ACTIVE | COMMUNITY

## 2024-03-19 RX ORDER — ALLOPURINOL 100 MG/1
TAKE 2  TABLET BY MOUTH ONCE DAILY TABLET ORAL
Refills: 0 | Status: ACTIVE | COMMUNITY
Start: 2018-02-23

## 2024-03-19 RX ORDER — LITHIUM CARBONATE 300 MG/1
TAKE ONE CAPSULE BY MOUTH 3 TIMES DAILY CAPSULE, GELATIN COATED ORAL
Refills: 0 | Status: ACTIVE | COMMUNITY
Start: 2018-03-20

## 2024-03-19 NOTE — HPI-TODAY'S VISIT:
This is a 43-year-old gentleman with a history of bipolar disorder, elevated liver enzymes secondary to nonalcoholic fatty liver disease, small hypoattenuating liver lesions suggestive of hepatic cyst on CT, and GERD presenting for follow-up. He was last seen in the office 9/6/2022.  He had liver enzyme elevation related to nonalcoholic fatty liver disease and small hypoattenuating liver lesions suggestive of hepatic cyst on CT (2020).  LFT elevation was stable.  He had gained 23 pounds since prior visit.  Previously ordered MRI to further characterize liver lesion/assess stability was not performed.  That was rescheduled.  Blood glucose levels were controlled.  Triglycerides were mildly elevated.  He was to continue efforts at glycemic and cholesterol control and resume efforts toward weight reduction through diet and exercise.  He had a longstanding history of acid reflux.  EGD in the 1990s was reportedly notable for esophageal ulcers.  He had recent breakthrough for which therapy was changed from pantoprazole to Dexilant.  He was asymptomatic.  Recommendations were to be discussed with Dr. Carter post visit.  It was determined that he was average risk for colon cancer.  Screening was recommended at age 44. Dr. Carter recommended EGD to screen for Mendiola's.  The patient was contacted and was unable to schedule EGD.  He reported that he would schedule at the time of his next office visit.  Multiple office visits in the interim have been rescheduled.

## 2024-05-09 ENCOUNTER — OFFICE VISIT (OUTPATIENT)
Dept: URBAN - METROPOLITAN AREA CLINIC 107 | Facility: CLINIC | Age: 44
End: 2024-05-09
Payer: COMMERCIAL

## 2024-05-09 ENCOUNTER — LAB OUTSIDE AN ENCOUNTER (OUTPATIENT)
Dept: URBAN - METROPOLITAN AREA CLINIC 107 | Facility: CLINIC | Age: 44
End: 2024-05-09

## 2024-05-09 ENCOUNTER — DASHBOARD ENCOUNTERS (OUTPATIENT)
Age: 44
End: 2024-05-09

## 2024-05-09 VITALS
WEIGHT: 281.4 LBS | BODY MASS INDEX: 39.4 KG/M2 | HEIGHT: 71 IN | SYSTOLIC BLOOD PRESSURE: 131 MMHG | DIASTOLIC BLOOD PRESSURE: 88 MMHG | TEMPERATURE: 97.8 F | HEART RATE: 70 BPM

## 2024-05-09 DIAGNOSIS — K21.9 GASTROESOPHAGEAL REFLUX DISEASE, UNSPECIFIED WHETHER ESOPHAGITIS PRESENT: ICD-10-CM

## 2024-05-09 DIAGNOSIS — K76.89 LIVER LESION: ICD-10-CM

## 2024-05-09 DIAGNOSIS — Z12.11 COLON CANCER SCREENING: ICD-10-CM

## 2024-05-09 DIAGNOSIS — K76.0 HEPATIC STEATOSIS: ICD-10-CM

## 2024-05-09 DIAGNOSIS — R74.8 ELEVATED LIVER ENZYMES: ICD-10-CM

## 2024-05-09 PROCEDURE — 99214 OFFICE O/P EST MOD 30 MIN: CPT

## 2024-05-09 RX ORDER — CARIPRAZINE 1.5 MG/1
1 CAPSULE CAPSULE, GELATIN COATED ORAL ONCE A DAY
Status: ACTIVE | COMMUNITY

## 2024-05-09 RX ORDER — ALLOPURINOL 100 MG/1
1 TABLET TABLET ORAL ONCE A DAY
Status: ACTIVE | COMMUNITY

## 2024-05-09 RX ORDER — LORATADINE 10 MG
1 PACKET MIXED WITH 8 OUNCES OF FLUID TABLET,DISINTEGRATING ORAL ONCE A DAY
Status: ACTIVE | COMMUNITY

## 2024-05-09 RX ORDER — CARVEDILOL 25 MG/1
1 TABLET WITH FOOD TABLET, FILM COATED ORAL TWICE A DAY
Status: ACTIVE | COMMUNITY

## 2024-05-09 RX ORDER — LURASIDONE HYDROCHLORIDE 60 MG/1
TAKE 3  TABLET BEDTIME TABLET, FILM COATED ORAL
Refills: 0 | Status: ACTIVE | COMMUNITY

## 2024-05-09 RX ORDER — ATORVASTATIN CALCIUM 20 MG/1
1 TABLET TABLET, FILM COATED ORAL ONCE A DAY
Status: ACTIVE | COMMUNITY

## 2024-05-09 RX ORDER — HYDROCHLOROTHIAZIDE 25 MG/1
1 TABLET IN THE MORNING TABLET ORAL ONCE A DAY
Status: ACTIVE | COMMUNITY

## 2024-05-09 RX ORDER — LITHIUM CARBONATE 300 MG/1
TAKE ONE CAPSULE BY MOUTH 3 TIMES DAILY CAPSULE, GELATIN COATED ORAL
Refills: 0 | Status: ACTIVE | COMMUNITY
Start: 2018-03-20

## 2024-05-09 RX ORDER — DEXLANSOPRAZOLE 60 MG/1
1 CAPSULE CAPSULE, DELAYED RELEASE ORAL ONCE A DAY
Status: ACTIVE | COMMUNITY

## 2024-05-09 RX ORDER — DEXLANSOPRAZOLE 60 MG/1
1 CAPSULE CAPSULE, DELAYED RELEASE PELLETS ORAL ONCE A DAY
Status: ACTIVE | COMMUNITY

## 2024-05-09 RX ORDER — ASPIRIN 81 MG/1
1 TABLET TABLET ORAL ONCE A DAY
Status: ACTIVE | COMMUNITY

## 2024-05-09 RX ORDER — DILTIAZEM HYDROCHLORIDE 240 MG/1
TAKE ONE CAPSULE BY MOUTH EVERY DAY CAPSULE, COATED, EXTENDED RELEASE ORAL
Qty: 90 | Refills: 0 | Status: ACTIVE | COMMUNITY
Start: 2018-01-15

## 2024-05-09 RX ORDER — PROPAFENONE HYDROCHLORIDE 225 MG/1
1 CAPSULE CAPSULE, EXTENDED RELEASE ORAL
Status: ACTIVE | COMMUNITY

## 2024-05-09 RX ORDER — ALLOPURINOL 100 MG/1
TAKE 2  TABLET BY MOUTH ONCE DAILY TABLET ORAL
Refills: 0 | Status: ACTIVE | COMMUNITY
Start: 2018-02-23

## 2024-05-09 RX ORDER — MIRTAZAPINE 30 MG/1
1 TABLET AT BEDTIME TABLET, FILM COATED ORAL ONCE A DAY
Status: ACTIVE | COMMUNITY

## 2024-11-14 ENCOUNTER — OFFICE VISIT (OUTPATIENT)
Dept: URBAN - METROPOLITAN AREA CLINIC 107 | Facility: CLINIC | Age: 44
End: 2024-11-14

## 2024-12-05 ENCOUNTER — OFFICE VISIT (OUTPATIENT)
Dept: URBAN - METROPOLITAN AREA CLINIC 107 | Facility: CLINIC | Age: 44
End: 2024-12-05

## 2024-12-11 ENCOUNTER — OFFICE VISIT (OUTPATIENT)
Dept: URBAN - METROPOLITAN AREA CLINIC 107 | Facility: CLINIC | Age: 44
End: 2024-12-11

## 2024-12-18 ENCOUNTER — OFFICE VISIT (OUTPATIENT)
Dept: URBAN - METROPOLITAN AREA CLINIC 107 | Facility: CLINIC | Age: 44
End: 2024-12-18

## 2025-01-13 ENCOUNTER — OFFICE VISIT (OUTPATIENT)
Dept: URBAN - METROPOLITAN AREA CLINIC 107 | Facility: CLINIC | Age: 45
End: 2025-01-13

## 2025-02-03 ENCOUNTER — OFFICE VISIT (OUTPATIENT)
Dept: URBAN - METROPOLITAN AREA CLINIC 107 | Facility: CLINIC | Age: 45
End: 2025-02-03

## 2025-03-04 ENCOUNTER — OFFICE VISIT (OUTPATIENT)
Dept: URBAN - METROPOLITAN AREA CLINIC 107 | Facility: CLINIC | Age: 45
End: 2025-03-04
Payer: COMMERCIAL

## 2025-03-04 ENCOUNTER — LAB OUTSIDE AN ENCOUNTER (OUTPATIENT)
Dept: URBAN - METROPOLITAN AREA CLINIC 107 | Facility: CLINIC | Age: 45
End: 2025-03-04

## 2025-03-04 VITALS
BODY MASS INDEX: 38.78 KG/M2 | TEMPERATURE: 97.7 F | DIASTOLIC BLOOD PRESSURE: 88 MMHG | HEART RATE: 64 BPM | HEIGHT: 71 IN | WEIGHT: 277 LBS | SYSTOLIC BLOOD PRESSURE: 147 MMHG

## 2025-03-04 DIAGNOSIS — K76.0 HEPATIC STEATOSIS: ICD-10-CM

## 2025-03-04 DIAGNOSIS — R74.8 ELEVATED LIVER ENZYMES: ICD-10-CM

## 2025-03-04 DIAGNOSIS — Z12.11 COLON CANCER SCREENING: ICD-10-CM

## 2025-03-04 DIAGNOSIS — K59.00 CONSTIPATION, UNSPECIFIED CONSTIPATION TYPE: ICD-10-CM

## 2025-03-04 DIAGNOSIS — K76.89 LIVER LESION: ICD-10-CM

## 2025-03-04 DIAGNOSIS — K21.9 GASTROESOPHAGEAL REFLUX DISEASE, UNSPECIFIED WHETHER ESOPHAGITIS PRESENT: ICD-10-CM

## 2025-03-04 PROBLEM — 14760008: Status: ACTIVE | Noted: 2025-03-04

## 2025-03-04 PROCEDURE — 99214 OFFICE O/P EST MOD 30 MIN: CPT | Performed by: NURSE PRACTITIONER

## 2025-03-04 RX ORDER — DEXLANSOPRAZOLE 60 MG/1
1 CAPSULE CAPSULE, DELAYED RELEASE PELLETS ORAL ONCE A DAY
Status: ACTIVE | COMMUNITY

## 2025-03-04 RX ORDER — ASPIRIN 81 MG/1
1 TABLET TABLET ORAL ONCE A DAY
Status: ACTIVE | COMMUNITY

## 2025-03-04 RX ORDER — ALLOPURINOL 100 MG/1
TAKE 2  TABLET BY MOUTH ONCE DAILY TABLET ORAL
Refills: 0 | Status: ACTIVE | COMMUNITY
Start: 2018-02-23

## 2025-03-04 RX ORDER — DILTIAZEM HYDROCHLORIDE 240 MG/1
TAKE ONE CAPSULE BY MOUTH EVERY DAY CAPSULE, COATED, EXTENDED RELEASE ORAL
Qty: 90 | Refills: 0 | Status: ACTIVE | COMMUNITY
Start: 2018-01-15

## 2025-03-04 RX ORDER — CARVEDILOL 25 MG/1
1 TABLET WITH FOOD TABLET, FILM COATED ORAL TWICE A DAY
Status: ACTIVE | COMMUNITY

## 2025-03-04 RX ORDER — CARIPRAZINE 1.5 MG/1
1 CAPSULE CAPSULE, GELATIN COATED ORAL ONCE A DAY
Status: ACTIVE | COMMUNITY

## 2025-03-04 RX ORDER — DEXLANSOPRAZOLE 60 MG/1
1 CAPSULE CAPSULE, DELAYED RELEASE ORAL ONCE A DAY
Status: ACTIVE | COMMUNITY

## 2025-03-04 RX ORDER — LITHIUM CARBONATE 300 MG/1
TAKE ONE CAPSULE BY MOUTH 3 TIMES DAILY CAPSULE, GELATIN COATED ORAL
Refills: 0 | Status: ACTIVE | COMMUNITY
Start: 2018-03-20

## 2025-03-04 RX ORDER — ATORVASTATIN CALCIUM 20 MG/1
1 TABLET TABLET, FILM COATED ORAL ONCE A DAY
Status: ACTIVE | COMMUNITY

## 2025-03-04 RX ORDER — HYDROCHLOROTHIAZIDE 25 MG/1
1 TABLET IN THE MORNING TABLET ORAL ONCE A DAY
Status: ACTIVE | COMMUNITY

## 2025-03-04 RX ORDER — LORATADINE 10 MG
1 PACKET MIXED WITH 8 OUNCES OF FLUID TABLET,DISINTEGRATING ORAL ONCE A DAY
Status: ACTIVE | COMMUNITY

## 2025-03-04 RX ORDER — PROPAFENONE HYDROCHLORIDE 225 MG/1
1 CAPSULE CAPSULE, EXTENDED RELEASE ORAL
Status: ACTIVE | COMMUNITY

## 2025-03-04 RX ORDER — MIRTAZAPINE 30 MG/1
1 TABLET AT BEDTIME TABLET, FILM COATED ORAL ONCE A DAY
Status: ACTIVE | COMMUNITY

## 2025-03-04 RX ORDER — ALLOPURINOL 100 MG/1
1 TABLET TABLET ORAL ONCE A DAY
Status: ACTIVE | COMMUNITY

## 2025-03-04 RX ORDER — LURASIDONE HYDROCHLORIDE 60 MG/1
TAKE 3  TABLET BEDTIME TABLET, FILM COATED ORAL
Refills: 0 | Status: ACTIVE | COMMUNITY

## 2025-03-04 NOTE — HPI-TODAY'S VISIT:
Mr. Carnes is a 44-year-old gentleman with history of bipolar disorder, elevated liver enzymes secondary to nonalcoholic fatty liver disease, small hypoattenuating liver lesion suggestive of hepatic cyst on CT presenting for a follow-up.   Last seen 5/9/2024 liver lesion, MRI ordered. For fatty liver recommend weight loss. For GERD continue Dexilant. Due for screening colonoscopy July 2024 he wished to defer screening at this time.  Previously seen 9/6/2022 for follow-up regarding his elevated liver enzymes and hepatic cyst.  His LFT elevation stable.  He had gained 20 pounds since last visit.  His previously ordered MRI to further characterize liver lesion/assess stability was not performed.  This was to be rescheduled.  Recommended that he continue tight glycemic control.  His triglycerides were mildly elevated.  Recommend that he resume efforts at weight loss reduction.  His GERD for which he had a longstanding history of acid reflux that with an EGD in the 1990s that reportedly demonstrated esophageal ulcers.  Recently had infrequent breakthrough for which therapy had changed from pantoprazole to Dexilant.  He is currently asymptomatic.  Ms. Dustin NP plan to discuss recommendations for endoscopy with Dr. Carter.  Regarding colon cancer screening.  He has family history of colon cancer in a second-degree relative maternal grandmother age 65) he is at average risk for colon cancer is recommended that he restart screening at age 44.  As patient was at risk for Mendiola's esophagus due to ethnicity, chronic GERD symptoms and history of esophageal ulcers and central obesity is recommended he undergo an elective EGD at some point.  Patient was contacted on 9/7/2022, but he declined scheduling at that time. Small hypoattenuating liver lesions suggestive of hepatic cysts on CT (9/2020)  Labs available for review from 1/4/2024 show low glucose 56, elevated serum creatinine 1.33, elevated ALK phosphatase 141, elevated AST 50, elevated ALT 85, normal CBC, elevated triglycerides 170, normal TSH, hemoglobin A1c 5.2%  Interval history, 3/4/2025 Has had 6 rescheduled appointments since last appointment Labs 5/30/2024.  CBC normal with Hgb 16.2.  CMP: Glucose 112, ALK phos 157, AST 46, ALT 77.  Hemoglobin A1c and TSH normal. Labs 2/6/2025.  BMP:CO2 18.  Vitamin D low at 9.3 with normal magnesium. KUB 12/31/2024 revealed constipation.  He did not get the MRI that was previously ordered. Was on Mounjaro for weight lossbut insurance stopped covering it.  Takes MiraLAX for constipation but does not have a BM daily. Linzess didn't work in the past, he states he tried it for 2 weeks. GERD controlled with PPI. No melena or hematochezia. Weight is down 4 pounds

## 2025-06-04 ENCOUNTER — OFFICE VISIT (OUTPATIENT)
Dept: URBAN - METROPOLITAN AREA CLINIC 107 | Facility: CLINIC | Age: 45
End: 2025-06-04

## 2025-07-01 ENCOUNTER — OFFICE VISIT (OUTPATIENT)
Dept: URBAN - METROPOLITAN AREA CLINIC 107 | Facility: CLINIC | Age: 45
End: 2025-07-01